# Patient Record
Sex: MALE | HISPANIC OR LATINO | Employment: PART TIME | URBAN - METROPOLITAN AREA
[De-identification: names, ages, dates, MRNs, and addresses within clinical notes are randomized per-mention and may not be internally consistent; named-entity substitution may affect disease eponyms.]

---

## 2022-09-19 ENCOUNTER — LAB (OUTPATIENT)
Dept: LAB | Facility: HOSPITAL | Age: 42
End: 2022-09-19

## 2022-09-19 ENCOUNTER — OFFICE VISIT (OUTPATIENT)
Dept: FAMILY MEDICINE CLINIC | Facility: CLINIC | Age: 42
End: 2022-09-19

## 2022-09-19 VITALS
HEART RATE: 69 BPM | WEIGHT: 139 LBS | OXYGEN SATURATION: 98 % | SYSTOLIC BLOOD PRESSURE: 126 MMHG | HEIGHT: 63 IN | BODY MASS INDEX: 24.63 KG/M2 | DIASTOLIC BLOOD PRESSURE: 74 MMHG

## 2022-09-19 DIAGNOSIS — I25.10 CORONARY ARTERY DISEASE INVOLVING NATIVE HEART WITHOUT ANGINA PECTORIS, UNSPECIFIED VESSEL OR LESION TYPE: ICD-10-CM

## 2022-09-19 DIAGNOSIS — R45.89 DEPRESSED MOOD: ICD-10-CM

## 2022-09-19 DIAGNOSIS — I10 PRIMARY HYPERTENSION: Primary | ICD-10-CM

## 2022-09-19 DIAGNOSIS — I10 PRIMARY HYPERTENSION: ICD-10-CM

## 2022-09-19 DIAGNOSIS — R35.0 URINARY FREQUENCY: ICD-10-CM

## 2022-09-19 DIAGNOSIS — R35.1 NOCTURIA: ICD-10-CM

## 2022-09-19 DIAGNOSIS — Z00.00 ENCOUNTER FOR MEDICAL EXAMINATION TO ESTABLISH CARE: ICD-10-CM

## 2022-09-19 DIAGNOSIS — R42 ORTHOSTATIC DIZZINESS: ICD-10-CM

## 2022-09-19 DIAGNOSIS — G47.9 DIFFICULTY SLEEPING: ICD-10-CM

## 2022-09-19 DIAGNOSIS — I50.32 CHRONIC DIASTOLIC (CONGESTIVE) HEART FAILURE: ICD-10-CM

## 2022-09-19 DIAGNOSIS — Z00.00 ENCOUNTER FOR SCREENING AND PREVENTATIVE CARE: ICD-10-CM

## 2022-09-19 DIAGNOSIS — K21.9 GASTROESOPHAGEAL REFLUX DISEASE, UNSPECIFIED WHETHER ESOPHAGITIS PRESENT: ICD-10-CM

## 2022-09-19 DIAGNOSIS — J43.9 PULMONARY EMPHYSEMA, UNSPECIFIED EMPHYSEMA TYPE: ICD-10-CM

## 2022-09-19 DIAGNOSIS — F41.9 ANXIETY: ICD-10-CM

## 2022-09-19 DIAGNOSIS — Z72.0 TOBACCO USE: ICD-10-CM

## 2022-09-19 LAB
DEPRECATED RDW RBC AUTO: 38.9 FL (ref 37–54)
ERYTHROCYTE [DISTWIDTH] IN BLOOD BY AUTOMATED COUNT: 11.9 % (ref 12.3–15.4)
HBA1C MFR BLD: 5.5 % (ref 4.8–5.6)
HCT VFR BLD AUTO: 50.6 % (ref 37.5–51)
HGB BLD-MCNC: 16.9 G/DL (ref 13–17.7)
MCH RBC QN AUTO: 29.9 PG (ref 26.6–33)
MCHC RBC AUTO-ENTMCNC: 33.4 G/DL (ref 31.5–35.7)
MCV RBC AUTO: 89.6 FL (ref 79–97)
PLATELET # BLD AUTO: 247 10*3/MM3 (ref 140–450)
PMV BLD AUTO: 9.7 FL (ref 6–12)
RBC # BLD AUTO: 5.65 10*6/MM3 (ref 4.14–5.8)
WBC NRBC COR # BLD: 8.72 10*3/MM3 (ref 3.4–10.8)

## 2022-09-19 PROCEDURE — 84153 ASSAY OF PSA TOTAL: CPT

## 2022-09-19 PROCEDURE — 83036 HEMOGLOBIN GLYCOSYLATED A1C: CPT

## 2022-09-19 PROCEDURE — 80053 COMPREHEN METABOLIC PANEL: CPT

## 2022-09-19 PROCEDURE — 85027 COMPLETE CBC AUTOMATED: CPT

## 2022-09-19 PROCEDURE — 80061 LIPID PANEL: CPT

## 2022-09-19 PROCEDURE — 99204 OFFICE O/P NEW MOD 45 MIN: CPT | Performed by: STUDENT IN AN ORGANIZED HEALTH CARE EDUCATION/TRAINING PROGRAM

## 2022-09-19 PROCEDURE — 84443 ASSAY THYROID STIM HORMONE: CPT

## 2022-09-19 RX ORDER — TAMSULOSIN HYDROCHLORIDE 0.4 MG/1
1 CAPSULE ORAL DAILY
COMMUNITY
End: 2022-11-18 | Stop reason: SDUPTHER

## 2022-09-19 RX ORDER — ESCITALOPRAM OXALATE 10 MG/1
10 TABLET ORAL DAILY
Qty: 30 TABLET | Refills: 2 | Status: SHIPPED | OUTPATIENT
Start: 2022-09-19 | End: 2022-10-17 | Stop reason: SDUPTHER

## 2022-09-19 RX ORDER — NITROGLYCERIN 0.3 MG/1
0.3 TABLET SUBLINGUAL
COMMUNITY

## 2022-09-19 RX ORDER — LISINOPRIL AND HYDROCHLOROTHIAZIDE 25; 20 MG/1; MG/1
1 TABLET ORAL DAILY
COMMUNITY
End: 2022-10-17

## 2022-09-19 RX ORDER — IBUPROFEN 800 MG/1
800 TABLET ORAL EVERY 6 HOURS PRN
COMMUNITY
End: 2022-10-17

## 2022-09-19 NOTE — PROGRESS NOTES
"  New Patient Office Visit      Patient Name: Meet Garner  : 1980   MRN: [id number]   Care Team: Patient Care Team:  Catrachita Miller DO as PCP - General (Internal Medicine)    Chief Complaint:    Chief Complaint   Patient presents with   • new patient preventative medicine service     Est care   • Anxiety     Referral to behavorial health    • Dizziness     When standing up to quick   • Leg Pain     Waking up at night, with real bad leg cramps        History of Present Illness: Meet Garner is a 42 y.o. male with HTN, anxiety, GERD, who is here today to establish care. He recently moved from [place] about 1 year ago. He works in machinery. Last PCP was Dr. Darryl Payne in [place], [place]. He is unsure about his current medications and past medical history. He did not bring his medications today. Biggest concerns are postural dizziness and urinary frequency.     Admits to urinary frequency after drinking any liquids. Has been taking tamsulosin. This is been a problem for several years but seems to be worsening. He is unable to empty completely, he has hesitancy and nocturia. He tries to monitor his fluid intake and does not drink prior to bedtime.     Dizziness - reports positional dizziness. Notices this only when move from sitting to standing or lying to standing. Occurs when jumping up really fast and takes about 10-15 minutes before resolving. Sometimes feels he is going to \"black out\". Room starts spinning.      HTN - taking lisinopril-HCTZ since . Blood pressure is well controlled.     He reports history of congestive heart failure. Last cardiologist was in [place], Dr. Bryan?. He has never had a heart cath but reports having mild heart attack several years ago. Reports family history of mother and father with CAD, brother with CAD and CHF in his 30s.    GERD - taking protonix which works well for him    anxiety - constantly worried about all the people in his life. Feels this " interferes with his relationships, productivity at work, and quality of life. He is very interested in establishing with behavioral health for talk therapy but wants to do this in person. Has palpitations with anxiety.    Tobacco use - 1ppd x 30 years. Not interested in quitting entirely today but will discuss in the future. States he was diagnosed with COPD and takes inhalers. Denies SOA, wheezing, dyspnea.     Subjective      Review of Systems:   Review of Systems - See HPI    Past Medical History:   Past Medical History:   Diagnosis Date   • CHF (congestive heart failure) (HCC)    • Hypertension        Past Surgical History: History reviewed. No pertinent surgical history.    Family History:   Family History   Problem Relation Age of Onset   • Stroke Mother    • Heart attack Mother    • Heart failure Mother    • Coronary artery disease Mother 50   • Heart attack Sister    • Coronary artery disease Sister         41   • Heart failure Brother         31   • Heart failure Maternal Grandmother        Social History:   Social History     Socioeconomic History   • Marital status: Single   Tobacco Use   • Smoking status: Current Every Day Smoker     Packs/day: 1.00     Types: Cigarettes   • Smokeless tobacco: Never Used   Vaping Use   • Vaping Use: Never used   Substance and Sexual Activity   • Alcohol use: Not Currently   • Drug use: Never   • Sexual activity: Defer       Tobacco History:   Social History     Tobacco Use   Smoking Status Current Every Day Smoker   • Packs/day: 1.00   • Types: Cigarettes   Smokeless Tobacco Never Used       Medications:     Current Outpatient Medications:   •  ibuprofen (ADVIL,MOTRIN) 800 MG tablet, Take 800 mg by mouth Every 6 (Six) Hours As Needed for Mild Pain., Disp: , Rfl:   •  lisinopril-hydrochlorothiazide (PRINZIDE,ZESTORETIC) 20-25 MG per tablet, Take 1 tablet by mouth Daily., Disp: , Rfl:   •  nitroglycerin (NITROSTAT) 0.3 MG SL tablet, Place 0.3 mg under the tongue., Disp: ,  "Rfl:   •  tamsulosin (FLOMAX) 0.4 MG capsule 24 hr capsule, Take 1 capsule by mouth Daily., Disp: , Rfl:   •  atorvastatin (LIPITOR) 40 MG tablet, Take 40 mg by mouth Daily., Disp: , Rfl:   •  escitalopram (Lexapro) 10 MG tablet, Take 1 tablet by mouth Daily., Disp: 30 tablet, Rfl: 2  •  metoprolol tartrate (LOPRESSOR) 25 MG tablet, Take 25 mg by mouth 2 (Two) Times a Day., Disp: , Rfl:   •  pantoprazole (PROTONIX) 40 MG EC tablet, Take 40 mg by mouth Daily., Disp: , Rfl:     Allergies:   Allergies   Allergen Reactions   • Codeine Swelling   • Penicillins Unknown - High Severity       Objective     Physical Exam:  Vital Signs:   Vitals:    09/19/22 0957 09/19/22 1102 09/19/22 1104 09/19/22 1105   BP: 126/74      Pulse: 69      SpO2: 99% 98% 98% 98%   Weight: 63 kg (139 lb)      Height: 160 cm (63\")        Body mass index is 24.62 kg/m².     Physical Exam  Vitals reviewed.   Constitutional:       Appearance: Normal appearance.   Cardiovascular:      Rate and Rhythm: Normal rate and regular rhythm.      Pulses: Normal pulses.      Heart sounds: Normal heart sounds. No murmur heard.  Pulmonary:      Effort: Pulmonary effort is normal. No respiratory distress.   Skin:     General: Skin is warm and dry.   Neurological:      General: No focal deficit present.      Mental Status: He is alert.      Cranial Nerves: No cranial nerve deficit.   Psychiatric:         Behavior: Behavior normal.         Judgment: Judgment normal.      Comments: anxious         Assessment / Plan      Assessment/Plan:   Problems Addressed This Visit  Diagnoses and all orders for this visit:    1. Primary hypertension (Primary)  -     CBC (No Diff); Future  -     Lipid Panel; Future  -     Comprehensive Metabolic Panel; Future  -     TSH; Future  -     Ambulatory Referral to [facility] [place] - [facility]    Well controlled with current metoprolol and lisinopril-HCTZ. Continue without changes.     2. Encounter for medical examination to " establish care  3. Encounter for screening and preventative care  Discussed importance of preventative care including vaccinations, age appropriate cancer screening, routine lab work, healthy diet, and active lifestyle.  Due for routine labs - ordered today  Will need annual once active problems have stabilized.     4. Pulmonary emphysema, unspecified emphysema type (HCC)  Reports history of COPD but does not use inhalers. would benefit from PFTs and further evaluation,  Will revisit this once other problems have stabilized. He is most concerned with her cardiac, mental and urinary healthy at this time. Encouraged smoking cessation but he is not ready.    5. Urinary frequency  6. Nocturia  -     PSA DIAGNOSTIC ONLY; Future  -     Urinalysis With Microscopic - Urine, Clean Catch; Future  - A1C    Labs and UA today. Continue tamsolosin 0.4mg daily. May be able to increase this to 0.8mg.    7. Orthostatic dizziness  8. Coronary artery disease involving native heart without angina pectoris, unspecified vessel or lesion type  9. Chronic diastolic (congestive) heart failure (HCC)  Family history of premature CAD  -     CBC (No Diff); Future  -     Lipid Panel; Future  -     Hemoglobin A1c; Future  -     Comprehensive Metabolic Panel; Future  -     TSH; Future    He reports history of CAD and CHF although he is a poor historian, unsure if he has had a heart cath, echo, or most recent stress. Prior cardiologist was in [place] several years ago, records requested.    I feels his dizziness sounds orthostatic in nature but it is abnormal that symptoms last for up to 15 minutes and with his history he would benefit from cardiac evaluation. Referred to heart and valve clinic.     10. Anxiety  11. Depressed mood  -     escitalopram (Lexapro) 10 MG tablet; Take 1 tablet by mouth Daily.  Dispense: 30 tablet; Refill: 2  Uncontrolled, start trial of SSRI and reassess in 4 weeks   Provided with information for patient to reach out  to local mental health providers for talk therapy in person.    12. Difficulty sleeping  -     escitalopram (Lexapro) 10 MG tablet; Take 1 tablet by mouth Daily.  Dispense: 30 tablet; Refill: 2  Difficulty sleeping is in response to uncontrolled anxiety, will work on management of anxiety as detailed above    13. Tobacco use  Encouraged smoking cessation and counseled on adverse health risks associated with continued smoking. Discussed options for assistance including NRT, medications, and cessation therapy. Patient is not agreeable to quitting smoking at this time    14. Gastroesophageal reflux disease, unspecified whether esophagitis present  Continue PPI        Plan of care reviewed with patient at the conclusion of today's visit. Education was provided regarding diagnosis and management.  Patient verbalizes understanding of and agreement with management plan.      Follow Up:   Return in about 4 weeks (around 10/17/2022) for Recheck mood, HTN, urinary symptoms. Needs 30 minute appointment.          DO DYLAN Willis RD  [facility]  [address]  [facility] [phone]  Fax [phone]  Phone [phone]

## 2022-09-20 LAB
ALBUMIN SERPL-MCNC: 4.3 G/DL (ref 3.5–5.2)
ALBUMIN/GLOB SERPL: 2.4 G/DL
ALP SERPL-CCNC: 83 U/L (ref 39–117)
ALT SERPL W P-5'-P-CCNC: 51 U/L (ref 1–41)
ANION GAP SERPL CALCULATED.3IONS-SCNC: 12.7 MMOL/L (ref 5–15)
AST SERPL-CCNC: 44 U/L (ref 1–40)
BILIRUB SERPL-MCNC: 0.3 MG/DL (ref 0–1.2)
BUN SERPL-MCNC: 19 MG/DL (ref 6–20)
BUN/CREAT SERPL: 11.5 (ref 7–25)
CALCIUM SPEC-SCNC: 9.5 MG/DL (ref 8.6–10.5)
CHLORIDE SERPL-SCNC: 97 MMOL/L (ref 98–107)
CHOLEST SERPL-MCNC: 124 MG/DL (ref 0–200)
CO2 SERPL-SCNC: 29.3 MMOL/L (ref 22–29)
CREAT SERPL-MCNC: 1.65 MG/DL (ref 0.76–1.27)
EGFRCR SERPLBLD CKD-EPI 2021: 52.8 ML/MIN/1.73
GLOBULIN UR ELPH-MCNC: 1.8 GM/DL
GLUCOSE SERPL-MCNC: 96 MG/DL (ref 65–99)
HDLC SERPL-MCNC: 36 MG/DL (ref 40–60)
LDLC SERPL CALC-MCNC: 55 MG/DL (ref 0–100)
LDLC/HDLC SERPL: 1.32 {RATIO}
POTASSIUM SERPL-SCNC: 3.7 MMOL/L (ref 3.5–5.2)
PROT SERPL-MCNC: 6.1 G/DL (ref 6–8.5)
PSA SERPL-MCNC: 0.98 NG/ML (ref 0–4)
SODIUM SERPL-SCNC: 139 MMOL/L (ref 136–145)
TRIGL SERPL-MCNC: 202 MG/DL (ref 0–150)
TSH SERPL DL<=0.05 MIU/L-ACNC: 1.63 UIU/ML (ref 0.27–4.2)
VLDLC SERPL-MCNC: 33 MG/DL (ref 5–40)

## 2022-09-21 ENCOUNTER — TELEPHONE (OUTPATIENT)
Dept: FAMILY MEDICINE CLINIC | Facility: CLINIC | Age: 42
End: 2022-09-21

## 2022-09-21 DIAGNOSIS — N17.9 AKI (ACUTE KIDNEY INJURY): Primary | ICD-10-CM

## 2022-09-21 NOTE — TELEPHONE ENCOUNTER
Please call patient to discuss results:  His kidney function is slightly lower than normal. I have not received his records yet and I do not have labs to compare to over the past 4 years so I am not sure if this is new or if he has history of chronic kidney disease. Is he aware of any kidney problems he may have? It is important that he remain hydrated and avoid medications such as advil, aleve, ibuprofen, other NSAIDs to avoid kidney damage. If this is a new problem, we will need to repeat his labs in 1 week to monitor.     His prostate marker is normal. Thyroid function is normal. Cholesterol looks good. Diabetes screening is negative. It looks like his urine sample was never collected?

## 2022-09-21 NOTE — TELEPHONE ENCOUNTER
Spoke with patient regarding lab results. Asked him if he has ever been told that he had kidney function decrease in the past. He did not recall.   Advised patient to avoid nsaids, and stay hydrated.   Explained to patient to come back in 1 week for repeat labs. The patient verbalized understanding and will repeat labs in 1 week.     Please place lab orders.

## 2022-09-23 ENCOUNTER — LAB (OUTPATIENT)
Dept: LAB | Facility: HOSPITAL | Age: 42
End: 2022-09-23

## 2022-09-23 DIAGNOSIS — N17.9 AKI (ACUTE KIDNEY INJURY): ICD-10-CM

## 2022-09-23 LAB
ANION GAP SERPL CALCULATED.3IONS-SCNC: 13.6 MMOL/L (ref 5–15)
BACTERIA UR QL AUTO: NORMAL /HPF
BILIRUB UR QL STRIP: NEGATIVE
BUN SERPL-MCNC: 20 MG/DL (ref 6–20)
BUN/CREAT SERPL: 16.3 (ref 7–25)
CALCIUM SPEC-SCNC: 9.6 MG/DL (ref 8.6–10.5)
CHLORIDE SERPL-SCNC: 102 MMOL/L (ref 98–107)
CLARITY UR: CLEAR
CO2 SERPL-SCNC: 25.4 MMOL/L (ref 22–29)
COLOR UR: YELLOW
CREAT SERPL-MCNC: 1.23 MG/DL (ref 0.76–1.27)
EGFRCR SERPLBLD CKD-EPI 2021: 75.2 ML/MIN/1.73
GLUCOSE SERPL-MCNC: 97 MG/DL (ref 65–99)
GLUCOSE UR STRIP-MCNC: NEGATIVE MG/DL
HGB UR QL STRIP.AUTO: NEGATIVE
HYALINE CASTS UR QL AUTO: NORMAL /LPF
KETONES UR QL STRIP: NEGATIVE
LEUKOCYTE ESTERASE UR QL STRIP.AUTO: NEGATIVE
NITRITE UR QL STRIP: NEGATIVE
PH UR STRIP.AUTO: 6 [PH] (ref 5–8)
POTASSIUM SERPL-SCNC: 3.9 MMOL/L (ref 3.5–5.2)
PROT UR QL STRIP: NEGATIVE
RBC # UR STRIP: NORMAL /HPF
REF LAB TEST METHOD: NORMAL
SODIUM SERPL-SCNC: 141 MMOL/L (ref 136–145)
SP GR UR STRIP: 1.02 (ref 1–1.03)
SQUAMOUS #/AREA URNS HPF: NORMAL /HPF
UROBILINOGEN UR QL STRIP: NORMAL
WBC # UR STRIP: NORMAL /HPF

## 2022-09-23 PROCEDURE — 81001 URINALYSIS AUTO W/SCOPE: CPT

## 2022-09-23 PROCEDURE — 80048 BASIC METABOLIC PNL TOTAL CA: CPT

## 2022-09-27 NOTE — PROGRESS NOTES
"[facility]  Heart [facility] Val[facility]    Chief Complaint  Establish Care, Hypertension, and Dizziness    Subjective    History of Present Illness {CC  Problem List  Visit  Diagnosis   Encounters  Notes  Medications  Labs  Result Review Imaging  Media :23}     Meet Garner is a 42 y.o. male with hypertension, hyperlipidemia,anxiety, GERD who presents today for evaluation of heart failure at the request of Dr. Miller    He reports around 2008 he started having episodes of near syncope/syncope. He says he would just pass out in class or just fall out of his bed. Symptoms also would happen after showers. He was told he had a heart attack. He was followed by Dr. Steinberg in [place]. He has never had a LHC. He was told that when he was in his 50s that he would need a stent or a pacemaker. He was placed on metoprolol. Over the past 2 weeks he has been having increasing episodes of near syncope. Works as a  and is exposed to a lot of chemicals. He notes worsening shortness of breath. Dizziness happens when he goes from sitting to standing, bending over or climbing stairs. He reports that the room spins but also \"blacks out\". Reports he \"blacked out\" at work recently and his boss had to catch him. He does report heart racing. Occasional chest discomfort, sometimes at rest and with stress. Sometimes with activity. He believes he was told he had congestive heart failure. No hx of ICD. No orthopnea/PND or edema.  He says he might have a hole in his heart    His mother reports a strong family hx of HF.She says she also has a hole in the back of her heart but denies weak heart muscle or ICD. She does have one stent placed in the late 40s or early 50s. His mother reports his sister is 41 and has had \"multiple heart surgeries\", she says she has stents and has been \"brought back to life\" multiple times but does not have an ICD. Brother also has CHF but no ICD    No ETOH. 1 cup coffee     " "Cardiac risks: HTN, tobacco abuse, gender, family hx    Objective     Vital Signs:   Vitals:    09/28/22 1009 09/28/22 1011 09/28/22 1012   BP: 106/62 122/64 122/76   BP Location: Right arm Left arm Left arm   Patient Position: Sitting Sitting Standing   Cuff Size: Adult Adult Adult   Pulse: 56 58 56   Resp: 18     Temp: 97 °F (36.1 °C)     TempSrc: Temporal     SpO2: 100%     Weight: 63 kg (139 lb)     Height: 160 cm (63\")       Body mass index is 24.62 kg/m².  Physical Exam  Vitals reviewed.   Constitutional:       Appearance: Normal appearance.   HENT:      Head: Normocephalic.   Neck:      Vascular: No carotid bruit.   Cardiovascular:      Rate and Rhythm: Regular rhythm. Bradycardia present.      Pulses: Normal pulses.      Heart sounds: Normal heart sounds, S1 normal and S2 normal. No murmur heard.  Pulmonary:      Effort: Pulmonary effort is normal. No respiratory distress.      Breath sounds: Normal breath sounds.   Chest:      Chest wall: No tenderness.   Abdominal:      General: Abdomen is flat.      Palpations: Abdomen is soft.   Musculoskeletal:      Cervical back: Neck supple.      Right lower leg: No edema.      Left lower leg: No edema.   Skin:     General: Skin is warm and dry.   Neurological:      General: No focal deficit present.      Mental Status: He is alert and oriented to person, place, and time. Mental status is at baseline.   Psychiatric:         Mood and Affect: Mood normal.         Behavior: Behavior normal.         Thought Content: Thought content normal.              Result Review  Data Reviewed:{ Labs  Result Review  Imaging  Med Tab  Media :23}   Urinalysis With Microscopic - Urine, Clean Catch (09/23/2022 08:10)  Basic metabolic panel (09/23/2022 08:10)  Urinalysis With Microscopic - Urine, Clean Catch (09/19/2022 11:20)  PSA DIAGNOSTIC ONLY (09/19/2022 11:19)  TSH (09/19/2022 11:19)  Comprehensive Metabolic Panel (09/19/2022 11:19)  Hemoglobin A1c (09/19/2022 11:19)  Lipid Panel " (09/19/2022 11:19)  CBC (No Diff) (09/19/2022 11:19)   EKG today shows sinus bradycardia, RSR or QR pattern in V1 suggestive of right ventricular conduction delay  Consultant notes Dr. Miller            Assessment and Plan {CC Problem List  Visit Diagnosis  ROS  Review (Popup)  Health Maintenance  Quality  BestPractice  Medications  SmartSets  SnapShot Encounters  Media :23}   1. SHELBY (dyspnea on exertion)  Reports history of heart failure, data deficit we will obtain cardiology records  - ECG 12 Lead; Future  - Adult Stress Echo W/ Cont or Stress Agent if Necessary Per Protocol; Future    2. Near syncope    - Adult Stress Echo W/ Cont or Stress Agent if Necessary Per Protocol; Future  - Mobile Cardiac Outpatient Telemetry; Future    3. Chest pain, unspecified type  Reports hx of MI in the past but has never had heart cath. Will try and get records  - Adult Stress Echo W/ Cont or Stress Agent if Necessary Per Protocol; Future    4. Syncope and collapse    - Adult Stress Echo W/ Cont or Stress Agent if Necessary Per Protocol; Future  - Mobile Cardiac Outpatient Telemetry; Future    5. Family history of premature CAD    - Adult Stress Echo W/ Cont or Stress Agent if Necessary Per Protocol; Future    6. Palpitations    - Adult Stress Echo W/ Cont or Stress Agent if Necessary Per Protocol; Future  - Mobile Cardiac Outpatient Telemetry; Future          Follow Up {Instructions Charge Capture  Follow-up Communications :23}   Return in about 6 weeks (around 11/9/2022) for Office follow up, Monitor results.    Patient was given instructions and counseling regarding his condition or for health maintenance advice. Please see specific information pulled into the AVS if appropriate.  Advised to call the Heart and Valve Center with any questions, concerns, or worsening symptoms.

## 2022-09-28 ENCOUNTER — HOSPITAL ENCOUNTER (OUTPATIENT)
Dept: CARDIOLOGY | Facility: HOSPITAL | Age: 42
Discharge: HOME | End: 2022-09-28

## 2022-09-28 ENCOUNTER — OFFICE VISIT (OUTPATIENT)
Dept: CARDIOLOGY | Facility: HOSPITAL | Age: 42
End: 2022-09-28

## 2022-09-28 VITALS
TEMPERATURE: 97 F | SYSTOLIC BLOOD PRESSURE: 122 MMHG | RESPIRATION RATE: 18 BRPM | WEIGHT: 139 LBS | OXYGEN SATURATION: 100 % | HEIGHT: 63 IN | HEART RATE: 56 BPM | DIASTOLIC BLOOD PRESSURE: 76 MMHG | BODY MASS INDEX: 24.63 KG/M2

## 2022-09-28 DIAGNOSIS — R55 SYNCOPE AND COLLAPSE: ICD-10-CM

## 2022-09-28 DIAGNOSIS — Z82.49 FAMILY HISTORY OF PREMATURE CAD: ICD-10-CM

## 2022-09-28 DIAGNOSIS — R55 NEAR SYNCOPE: ICD-10-CM

## 2022-09-28 DIAGNOSIS — R00.2 PALPITATIONS: ICD-10-CM

## 2022-09-28 DIAGNOSIS — R06.09 DOE (DYSPNEA ON EXERTION): ICD-10-CM

## 2022-09-28 DIAGNOSIS — R06.09 DOE (DYSPNEA ON EXERTION): Primary | ICD-10-CM

## 2022-09-28 DIAGNOSIS — R07.9 CHEST PAIN, UNSPECIFIED TYPE: ICD-10-CM

## 2022-09-28 PROBLEM — K21.9 GASTROESOPHAGEAL REFLUX DISEASE: Status: ACTIVE | Noted: 2022-09-28

## 2022-09-28 PROBLEM — G47.9 DIFFICULTY SLEEPING: Status: ACTIVE | Noted: 2022-09-28

## 2022-09-28 PROBLEM — F41.9 ANXIETY: Status: ACTIVE | Noted: 2022-09-28

## 2022-09-28 PROBLEM — I10 PRIMARY HYPERTENSION: Status: ACTIVE | Noted: 2022-09-28

## 2022-09-28 PROBLEM — R42 ORTHOSTATIC DIZZINESS: Status: ACTIVE | Noted: 2022-09-28

## 2022-09-28 PROBLEM — I25.10 CORONARY ARTERY DISEASE INVOLVING NATIVE HEART WITHOUT ANGINA PECTORIS: Status: ACTIVE | Noted: 2022-09-28

## 2022-09-28 PROBLEM — R35.1 NOCTURIA: Status: ACTIVE | Noted: 2022-09-28

## 2022-09-28 PROBLEM — Z72.0 TOBACCO USE: Status: ACTIVE | Noted: 2022-09-28

## 2022-09-28 PROBLEM — R45.89 DEPRESSED MOOD: Status: ACTIVE | Noted: 2022-09-28

## 2022-09-28 LAB
QT INTERVAL: 414 MS
QTC INTERVAL: 385 MS

## 2022-09-28 PROCEDURE — 93005 ELECTROCARDIOGRAM TRACING: CPT | Performed by: NURSE PRACTITIONER

## 2022-09-28 PROCEDURE — 93010 ELECTROCARDIOGRAM REPORT: CPT | Performed by: INTERNAL MEDICINE

## 2022-09-28 PROCEDURE — 99204 OFFICE O/P NEW MOD 45 MIN: CPT | Performed by: NURSE PRACTITIONER

## 2022-09-28 RX ORDER — ATORVASTATIN CALCIUM 40 MG/1
40 TABLET, FILM COATED ORAL DAILY
COMMUNITY
End: 2022-11-18 | Stop reason: SDUPTHER

## 2022-09-28 RX ORDER — PANTOPRAZOLE SODIUM 40 MG/1
40 TABLET, DELAYED RELEASE ORAL DAILY
COMMUNITY
End: 2022-11-18 | Stop reason: SDUPTHER

## 2022-09-28 NOTE — PROGRESS NOTES
[facility] Heart Monitor Documentation    Meet Garner  1980  [id number]  09/28/22      [] ZIO XT Patch  Model [id number] Prescribed for N/A Days    · Serial Number: (N + 9 Digits) N   · Apply-By Date on Box:   · USPS Tracking Number:   · USPS Tracking        [] [employer] Body[employer]ian MINI PLUS Mobile Cardiac Telemetry  Model BGMINIPLUS Prescribed for 30 Days    · Serial Number: (BGM + 7 Digits) [id number]  · Shipped-By Date on Box: 9/16/22  · UPS Tracking Number: [id number]  · UPS Tracking      [] [employer] MINI Holter Monitor  Model BGMINIEL Prescribed for N/A Days    · Serial Number: (7 Digits)   · Shipped-By Date on Box:   · UPS Tracking Number: [id number]  · UPS Tracking        This monitor was applied to the patient's chest and checked for proper functioning.  Mr. Meet Ganrer was instructed in the proper use of this monitor.  He was given the opportunity to ask questions and left the office with the device 's instruction manual.    Maximo Andujar MA, 11:03 EDT, 09/28/22                  [facility]

## 2022-09-29 ENCOUNTER — TELEPHONE (OUTPATIENT)
Dept: CARDIOLOGY | Facility: HOSPITAL | Age: 42
End: 2022-09-29

## 2022-09-29 NOTE — TELEPHONE ENCOUNTER
----- Message from Maximo Andujar MA sent at 9/29/2022  9:45 AM EDT -----  So I have spoke to Everyone I could find in [place] with a MD brown or even [facility], and they don't have the patient listed. Ill call the patient.  ----- Message -----  From: Socorro Medeiros APRN  Sent: 9/28/2022  10:39 AM EDT  To: Maximo Andujar MA    Can you please get records from Dr. Brown, cardiologist in [employer]?

## 2022-10-10 ENCOUNTER — TELEPHONE (OUTPATIENT)
Dept: FAMILY MEDICINE CLINIC | Facility: CLINIC | Age: 42
End: 2022-10-10

## 2022-10-10 NOTE — TELEPHONE ENCOUNTER
Caller: BONILLA DUARTE    Relationship: Emergency Contact    Best call back number:  [phone]    What form or medical record are you requesting:     NOTE STATING PATIENT CAN BE AT WORK IN HIS CONDITION       How would you like to receive the form or medical records (pick-up, mail, fax):      PICKUP     Timeframe paperwork needed: AS SOON AS POSSIBLE    Additional notes:     PLEASE CALL WHEN PAPERWORK IS READY

## 2022-10-10 NOTE — TELEPHONE ENCOUNTER
Caller: BONILLA DUARTE    Relationship: Emergency Contact    Best call back number: [phone]    What form or medical record are you requesting: WORK RELEASE    Who is requesting this form or medical record from you: WORK    How would you like to receive the form or medical records (pick-up, mail, fax):     Timeframe paperwork needed: ASAP      NEED A PAPER STATING HE CAN RETURN TO WORK.

## 2022-10-11 ENCOUNTER — TELEPHONE (OUTPATIENT)
Dept: CARDIOLOGY | Facility: HOSPITAL | Age: 42
End: 2022-10-11

## 2022-10-11 DIAGNOSIS — R07.9 CHEST PAIN, UNSPECIFIED TYPE: ICD-10-CM

## 2022-10-11 DIAGNOSIS — R00.2 PALPITATIONS: ICD-10-CM

## 2022-10-11 DIAGNOSIS — R55 NEAR SYNCOPE: Primary | ICD-10-CM

## 2022-10-11 DIAGNOSIS — R06.09 DOE (DYSPNEA ON EXERTION): ICD-10-CM

## 2022-10-11 DIAGNOSIS — R55 SYNCOPE AND COLLAPSE: ICD-10-CM

## 2022-10-11 NOTE — TELEPHONE ENCOUNTER
----- Message from Maximo Andujar MA sent at 10/11/2022  4:11 PM EDT -----  Just got word That Dr. Steinberg did occassionally see prisoners, patient was apparently incarcerated. But they do not however have the ability to share the records. She is going to talk to her Supervisor to see if she can but she doesn't think so, ill let ya know.  ----- Message -----  From: Socorro Medeiros APRN  Sent: 10/11/2022   3:50 PM EDT  To: VENANCIO Whitaker,    Were you ever able to find records on this patient?    Thanks!

## 2022-10-12 ENCOUNTER — HOSPITAL ENCOUNTER (OUTPATIENT)
Dept: CARDIOLOGY | Facility: HOSPITAL | Age: 42
End: 2022-10-12

## 2022-10-14 ENCOUNTER — APPOINTMENT (OUTPATIENT)
Dept: CARDIOLOGY | Facility: HOSPITAL | Age: 42
End: 2022-10-14

## 2022-10-17 ENCOUNTER — OFFICE VISIT (OUTPATIENT)
Dept: FAMILY MEDICINE CLINIC | Facility: CLINIC | Age: 42
End: 2022-10-17

## 2022-10-17 VITALS
BODY MASS INDEX: 25.16 KG/M2 | DIASTOLIC BLOOD PRESSURE: 78 MMHG | HEART RATE: 76 BPM | TEMPERATURE: 97.3 F | WEIGHT: 142 LBS | SYSTOLIC BLOOD PRESSURE: 120 MMHG | HEIGHT: 63 IN | OXYGEN SATURATION: 97 %

## 2022-10-17 DIAGNOSIS — G47.9 DIFFICULTY SLEEPING: ICD-10-CM

## 2022-10-17 DIAGNOSIS — F41.9 ANXIETY: Primary | ICD-10-CM

## 2022-10-17 DIAGNOSIS — K62.5 BRBPR (BRIGHT RED BLOOD PER RECTUM): ICD-10-CM

## 2022-10-17 DIAGNOSIS — I10 PRIMARY HYPERTENSION: ICD-10-CM

## 2022-10-17 DIAGNOSIS — Z23 NEED FOR INFLUENZA VACCINATION: ICD-10-CM

## 2022-10-17 DIAGNOSIS — R00.2 PALPITATIONS: ICD-10-CM

## 2022-10-17 DIAGNOSIS — K59.00 CONSTIPATION, UNSPECIFIED CONSTIPATION TYPE: ICD-10-CM

## 2022-10-17 DIAGNOSIS — R45.89 DEPRESSED MOOD: ICD-10-CM

## 2022-10-17 DIAGNOSIS — Z82.49 FAMILY HISTORY OF PREMATURE CAD: ICD-10-CM

## 2022-10-17 PROCEDURE — 99214 OFFICE O/P EST MOD 30 MIN: CPT | Performed by: STUDENT IN AN ORGANIZED HEALTH CARE EDUCATION/TRAINING PROGRAM

## 2022-10-17 PROCEDURE — 90471 IMMUNIZATION ADMIN: CPT | Performed by: STUDENT IN AN ORGANIZED HEALTH CARE EDUCATION/TRAINING PROGRAM

## 2022-10-17 PROCEDURE — 90686 IIV4 VACC NO PRSV 0.5 ML IM: CPT | Performed by: STUDENT IN AN ORGANIZED HEALTH CARE EDUCATION/TRAINING PROGRAM

## 2022-10-17 RX ORDER — AMLODIPINE BESYLATE 5 MG/1
5 TABLET ORAL DAILY
Qty: 30 TABLET | Refills: 2 | Status: SHIPPED | OUTPATIENT
Start: 2022-10-17 | End: 2022-11-18 | Stop reason: SDUPTHER

## 2022-10-17 RX ORDER — ESCITALOPRAM OXALATE 20 MG/1
20 TABLET ORAL DAILY
Qty: 30 TABLET | Refills: 5 | Status: SHIPPED | OUTPATIENT
Start: 2022-10-17

## 2022-10-17 RX ORDER — POLYETHYLENE GLYCOL 3350 17 G/17G
17 POWDER, FOR SOLUTION ORAL DAILY
Qty: 30 EACH | Refills: 5 | Status: SHIPPED | OUTPATIENT
Start: 2022-10-17 | End: 2022-11-18

## 2022-10-17 RX ORDER — LISINOPRIL 20 MG/1
20 TABLET ORAL DAILY
Qty: 30 TABLET | Refills: 2 | Status: SHIPPED | OUTPATIENT
Start: 2022-10-17 | End: 2022-11-18 | Stop reason: SDUPTHER

## 2022-10-17 NOTE — PROGRESS NOTES
"  Established Office Visit      Patient Name: Meet Garner  : 1980   MRN: [id number]   Care Team: Patient Care Team:  Catrachita Miller DO as PCP - General (Internal Medicine)    Chief Complaint:    Chief Complaint   Patient presents with   • depressed mood     4 week f/u    • Hypertension   • Difficulty Urinating       History of Present Illness: Meet Garner is a 42 y.o. male with HTN, anxiety, depression, GERD, tobacco use who is here today to follow up with LUTS, HTN, anxiety.    HTN - Well controlled with current lisinopril-HCTZ     Anxiety/depression - started lexapro 10mg daily about 4 weeks ago. He has noticed improvement in irritability, racing thoughts, aggravation. He requests increasing this dose.     He reports feeling palpitations, light headed, dizzy on 10/14. He was taken to the nearest hospital, [facility]. He was told in the ER that he \"had a mild heart attack\" but was not admitted. He denies any medication changes. He is wearing an event monitor and following with heart and valve clinic, has follow up in 11 days.     LUTS - symptoms unchanged since last visit. Having to urinate multiple times per hour. He is taking HCTZ for several years and has had urinary frequency for several years. He would like to change BP medications.     He reports intermittent BRBPR over the past 2-3 weeks. Describes as a toilet full of blood. Reports occasional pain with defecation. He has occasional constipation.     Subjective      Review of Systems:   Review of Systems - See HPI    I have reviewed and the following portions of the patient's history were updated as appropriate: past family history, past medical history, past social history, past surgical history and problem list.    Medications:     Current Outpatient Medications:   •  atorvastatin (LIPITOR) 40 MG tablet, Take 40 mg by mouth Daily., Disp: , Rfl:   •  escitalopram (Lexapro) 20 MG tablet, Take 1 tablet by mouth Daily., Disp: 30 tablet, Rfl: " "5  •  nitroglycerin (NITROSTAT) 0.3 MG SL tablet, Place 0.3 mg under the tongue., Disp: , Rfl:   •  pantoprazole (PROTONIX) 40 MG EC tablet, Take 40 mg by mouth Daily., Disp: , Rfl:   •  tamsulosin (FLOMAX) 0.4 MG capsule 24 hr capsule, Take 1 capsule by mouth Daily., Disp: , Rfl:   •  amLODIPine (NORVASC) 5 MG tablet, Take 1 tablet by mouth Daily., Disp: 30 tablet, Rfl: 2  •  lisinopril (PRINIVIL,ZESTRIL) 20 MG tablet, Take 1 tablet by mouth Daily., Disp: 30 tablet, Rfl: 2  •  metoprolol tartrate (LOPRESSOR) 25 MG tablet, Take 25 mg by mouth 2 (Two) Times a Day., Disp: , Rfl:   •  polyethylene glycol (MIRALAX) 17 g packet, Take 17 g by mouth Daily., Disp: 30 each, Rfl: 5    Allergies:   Allergies   Allergen Reactions   • Codeine Swelling   • Penicillins Unknown - High Severity       Objective     Physical Exam:  Vital Signs:   Vitals:    10/17/22 0920   BP: 120/78   Pulse: 76   Temp: 97.3 °F (36.3 °C)   SpO2: 97%   Weight: 64.4 kg (142 lb)   Height: 160 cm (63\")     Body mass index is 25.15 kg/m².     Physical Exam  Vitals reviewed.   Constitutional:       Appearance: Normal appearance.   Cardiovascular:      Rate and Rhythm: Normal rate and regular rhythm.      Heart sounds: Normal heart sounds. No murmur heard.     Comments: No LE edema   Pulmonary:      Effort: Pulmonary effort is normal. No respiratory distress.      Breath sounds: Normal breath sounds. No wheezing.   Skin:     General: Skin is warm and dry.   Neurological:      Mental Status: He is alert.   Psychiatric:         Mood and Affect: Mood normal.         Behavior: Behavior normal.         Judgment: Judgment normal.         Assessment / Plan      Assessment/Plan:   Problems Addressed This Visit  Diagnoses and all orders for this visit:    1. Anxiety (Primary)  2. Depressed mood  -     escitalopram (Lexapro) 20 MG tablet; Take 1 tablet by mouth Daily.  Dispense: 30 tablet; Refill: 5  Improved, will increase lexapro to 20mg daily. Reassess in 4-6 weeks " "    3. Difficulty sleeping  -     escitalopram (Lexapro) 20 MG tablet; Take 1 tablet by mouth Daily.  Dispense: 30 tablet; Refill: 5    4. Constipation, unspecified constipation type  -     Ambulatory Referral to Gastroenterology  -     Ambulatory Referral For Screening Colonoscopy  -     polyethylene glycol (MIRALAX) 17 g packet; Take 17 g by mouth Daily.  Dispense: 30 each; Refill: 5    5. BRBPR (bright red blood per rectum)  -     Ambulatory Referral to Gastroenterology  -     Ambulatory Referral For Screening Colonoscopy  New and requires further workup. We discussed multiple causes for painful intermittent BRPBR with most likely being anal fissure or hemorrhoids. He reports large amount of blood in toilet bowel and wants to have colonoscopy for further evaluation to rule out malignancy. I have referred to GI and for colonoscopy. Discussed the importance of treating constipation and avoiding straining. Recommended increased water intake and miralax daily.     6. Primary hypertension  Will switch from HCTZ to amlodipine 5mg, he feels HCTZ may be causing his frequent urination. Continue lisinopril 20mg daily. Return in 4-6 weeks for HTN and see if LUTS have improved    7. Family history of premature CAD  8. Palpitations  -     lisinopril (PRINIVIL,ZESTRIL) 20 MG tablet; Take 1 tablet by mouth Daily.  Dispense: 30 tablet; Refill: 2  -     amLODIPine (NORVASC) 5 MG tablet; Take 1 tablet by mouth Daily.  Dispense: 30 tablet; Refill: 2  He is following with heart and valve clinic, wearing event monitor now. He denies current CP and palpitations. I have requested records from [facility] for independent review. Patient reports \"mild heart attack\" last week but this diagnosis is uncertain as he was not admitted and did not undergo any medication changes.     9. Need for influenza vaccination  -     FluLaval/Fluzone >6 mos ([id number])          Plan of care reviewed with patient at the conclusion of today's visit. " Education was provided regarding diagnosis and management.  Patient verbalizes understanding of and agreement with management plan.    Follow Up:   Return in about 4 weeks (around 11/14/2022) for 4-6 weeks to Recheck HTN, mood .        DO DYLAN Willis RD  [facility]  [address]  [facility] [address]  Fax [phone]  Phone [phone]

## 2022-10-20 ENCOUNTER — DOCUMENTATION (OUTPATIENT)
Dept: FAMILY MEDICINE CLINIC | Facility: CLINIC | Age: 42
End: 2022-10-20

## 2022-10-20 ENCOUNTER — TELEPHONE (OUTPATIENT)
Dept: FAMILY MEDICINE CLINIC | Facility: CLINIC | Age: 42
End: 2022-10-20

## 2022-10-20 NOTE — TELEPHONE ENCOUNTER
Caller: Meet Garner    Relationship: Self    Best call back number: [phone]    What form or medical record are you requesting:     WORK/CLASS EXCUSE    Who is requesting this form or medical record from you: EMPLOYER    How would you like to receive the form or medical records (pick-up, mail, fax):   PICKUP       Timeframe paperwork needed: TODAY 10/20    Additional notes:     PATIENT WAS IN THE OFFICE ON OCT 17TH.  NEEDS A WORK/CLASS EXCUSE FOR 10/17-10/20.    PLEASE CALL PATIENT WHEN EXCUSE IS READY

## 2022-10-20 NOTE — TELEPHONE ENCOUNTER
Contacted patient to inquire further, he reports that his school advised him to take some time off but requested that he provide documentation that he was at our office 10/17 excusing him until today    Letter has been written and placed at patient pickup for retrieval, as requested

## 2022-10-20 NOTE — PROGRESS NOTES
Reviewed ER records from [facility] [place], [place] 9/30/22. Patient presented to ER with nonspecific chest pain. Records report stable EKG and nonactionable labs. He was discharged home. No record of NSTEMI/STEMI.

## 2022-10-25 ENCOUNTER — HOSPITAL ENCOUNTER (OUTPATIENT)
Dept: CARDIOLOGY | Facility: HOSPITAL | Age: 42
Discharge: HOME | End: 2022-10-25

## 2022-10-25 ENCOUNTER — TELEPHONE (OUTPATIENT)
Dept: CARDIOLOGY | Facility: HOSPITAL | Age: 42
End: 2022-10-25

## 2022-10-25 VITALS
SYSTOLIC BLOOD PRESSURE: 110 MMHG | DIASTOLIC BLOOD PRESSURE: 82 MMHG | WEIGHT: 142 LBS | HEIGHT: 63 IN | HEART RATE: 64 BPM | BODY MASS INDEX: 25.16 KG/M2

## 2022-10-25 VITALS — BODY MASS INDEX: 25.16 KG/M2 | HEIGHT: 63 IN | WEIGHT: 141.98 LBS

## 2022-10-25 DIAGNOSIS — R00.2 PALPITATIONS: ICD-10-CM

## 2022-10-25 DIAGNOSIS — R06.09 DOE (DYSPNEA ON EXERTION): ICD-10-CM

## 2022-10-25 DIAGNOSIS — R55 SYNCOPE AND COLLAPSE: ICD-10-CM

## 2022-10-25 DIAGNOSIS — R07.9 CHEST PAIN, UNSPECIFIED TYPE: ICD-10-CM

## 2022-10-25 DIAGNOSIS — R55 NEAR SYNCOPE: ICD-10-CM

## 2022-10-25 DIAGNOSIS — R07.9 CHEST PAIN, UNSPECIFIED TYPE: Primary | ICD-10-CM

## 2022-10-25 DIAGNOSIS — Z82.49 FAMILY HISTORY OF PREMATURE CAD: ICD-10-CM

## 2022-10-25 LAB
BH CV ECHO MEAS - AO MAX PG: 7.4 MMHG
BH CV ECHO MEAS - AO MEAN PG: 4.4 MMHG
BH CV ECHO MEAS - AO ROOT DIAM: 2.9 CM
BH CV ECHO MEAS - AO V2 MAX: 135.7 CM/SEC
BH CV ECHO MEAS - AO V2 VTI: 32.7 CM
BH CV ECHO MEAS - AVA(I,D): 1.37 CM2
BH CV ECHO MEAS - EDV(CUBED): 107 ML
BH CV ECHO MEAS - EDV(MOD-SP2): 89.3 ML
BH CV ECHO MEAS - EDV(MOD-SP4): 97 ML
BH CV ECHO MEAS - EF(MOD-BP): 63.1 %
BH CV ECHO MEAS - EF(MOD-SP2): 64.6 %
BH CV ECHO MEAS - EF(MOD-SP4): 61.6 %
BH CV ECHO MEAS - ESV(CUBED): 20.8 ML
BH CV ECHO MEAS - ESV(MOD-SP2): 31.6 ML
BH CV ECHO MEAS - ESV(MOD-SP4): 37.2 ML
BH CV ECHO MEAS - FS: 42.1 %
BH CV ECHO MEAS - IVS/LVPW: 1.2 CM
BH CV ECHO MEAS - IVSD: 0.72 CM
BH CV ECHO MEAS - LA DIMENSION: 3.3 CM
BH CV ECHO MEAS - LAT PEAK E' VEL: 12.7 CM/SEC
BH CV ECHO MEAS - LV DIASTOLIC VOL/BSA (35-75): 58 CM2
BH CV ECHO MEAS - LV MASS(C)D: 97.8 GRAMS
BH CV ECHO MEAS - LV MAX PG: 2.35 MMHG
BH CV ECHO MEAS - LV MEAN PG: 1.33 MMHG
BH CV ECHO MEAS - LV SYSTOLIC VOL/BSA (12-30): 22.3 CM2
BH CV ECHO MEAS - LV V1 MAX: 76.7 CM/SEC
BH CV ECHO MEAS - LV V1 VTI: 17.7 CM
BH CV ECHO MEAS - LVIDD: 4.7 CM
BH CV ECHO MEAS - LVIDS: 2.8 CM
BH CV ECHO MEAS - LVOT AREA: 2.5 CM2
BH CV ECHO MEAS - LVOT DIAM: 1.79 CM
BH CV ECHO MEAS - LVPWD: 0.6 CM
BH CV ECHO MEAS - MED PEAK E' VEL: 10.7 CM/SEC
BH CV ECHO MEAS - MV A MAX VEL: 37.7 CM/SEC
BH CV ECHO MEAS - MV DEC SLOPE: 488.3 CM/SEC2
BH CV ECHO MEAS - MV DEC TIME: 0.24 MSEC
BH CV ECHO MEAS - MV E MAX VEL: 92.5 CM/SEC
BH CV ECHO MEAS - MV E/A: 2.45
BH CV ECHO MEAS - MV MAX PG: 4.5 MMHG
BH CV ECHO MEAS - MV MEAN PG: 1.73 MMHG
BH CV ECHO MEAS - MV P1/2T: 61.4 MSEC
BH CV ECHO MEAS - MV V2 VTI: 33.5 CM
BH CV ECHO MEAS - MVA(P1/2T): 3.6 CM2
BH CV ECHO MEAS - MVA(VTI): 1.33 CM2
BH CV ECHO MEAS - PA ACC TIME: 0.17 SEC
BH CV ECHO MEAS - PA PR(ACCEL): 4.5 MMHG
BH CV ECHO MEAS - PA V2 MAX: 123.5 CM/SEC
BH CV ECHO MEAS - RAP SYSTOLE: 3 MMHG
BH CV ECHO MEAS - RVSP: 12 MMHG
BH CV ECHO MEAS - SI(MOD-SP2): 34.5 ML/M2
BH CV ECHO MEAS - SI(MOD-SP4): 35.8 ML/M2
BH CV ECHO MEAS - SV(LVOT): 44.7 ML
BH CV ECHO MEAS - SV(MOD-SP2): 57.7 ML
BH CV ECHO MEAS - SV(MOD-SP4): 59.8 ML
BH CV ECHO MEAS - TAPSE (>1.6): 2.03 CM
BH CV ECHO MEAS - TR MAX PG: 8.6 MMHG
BH CV ECHO MEAS - TR MAX VEL: 146.6 CM/SEC
BH CV ECHO MEASUREMENTS AVERAGE E/E' RATIO: 7.91
BH CV STRESS BP STAGE 1: NORMAL
BH CV STRESS BP STAGE 2: NORMAL
BH CV STRESS BP STAGE 3: NORMAL
BH CV STRESS DURATION MIN STAGE 1: 3
BH CV STRESS DURATION MIN STAGE 2: 3
BH CV STRESS DURATION MIN STAGE 3: 3
BH CV STRESS DURATION MIN STAGE 4: 0
BH CV STRESS DURATION SEC STAGE 1: 0
BH CV STRESS DURATION SEC STAGE 2: 0
BH CV STRESS DURATION SEC STAGE 3: 0
BH CV STRESS DURATION SEC STAGE 4: 58
BH CV STRESS GRADE STAGE 1: 10
BH CV STRESS GRADE STAGE 2: 12
BH CV STRESS GRADE STAGE 3: 14
BH CV STRESS GRADE STAGE 4: 16
BH CV STRESS HR STAGE 1: 107
BH CV STRESS HR STAGE 2: 123
BH CV STRESS HR STAGE 3: 139
BH CV STRESS HR STAGE 4: 151
BH CV STRESS METS STAGE 1: 5
BH CV STRESS METS STAGE 2: 7.5
BH CV STRESS METS STAGE 3: 10
BH CV STRESS METS STAGE 4: 13.5
BH CV STRESS O2 STAGE 1: 98
BH CV STRESS O2 STAGE 2: 99
BH CV STRESS O2 STAGE 3: 98
BH CV STRESS O2 STAGE 4: 98
BH CV STRESS PROTOCOL 1: NORMAL
BH CV STRESS RECOVERY BP: NORMAL MMHG
BH CV STRESS RECOVERY HR: 102 BPM
BH CV STRESS RECOVERY O2: 98 %
BH CV STRESS SPEED STAGE 1: 1.7
BH CV STRESS SPEED STAGE 2: 2.5
BH CV STRESS SPEED STAGE 3: 3.4
BH CV STRESS SPEED STAGE 4: 4.2
BH CV STRESS STAGE 1: 1
BH CV STRESS STAGE 2: 2
BH CV STRESS STAGE 3: 3
BH CV STRESS STAGE 4: 4
BH CV VAS BP LEFT ARM: NORMAL MMHG
BH CV XLRA - RV BASE: 3 CM
BH CV XLRA - RV LENGTH: 6.8 CM
BH CV XLRA - RV MID: 2.14 CM
BH CV XLRA - TDI S': 11.9 CM/SEC
LEFT ATRIUM VOLUME INDEX: 19.5 ML/M2
MAXIMAL PREDICTED HEART RATE: 178 BPM
MAXIMAL PREDICTED HEART RATE: 178 BPM
PERCENT MAX PREDICTED HR: 85.96 %
STRESS BASELINE BP: NORMAL MMHG
STRESS BASELINE HR: 64 BPM
STRESS O2 SAT REST: 100 %
STRESS PERCENT HR: 101 %
STRESS POST ESTIMATED WORKLOAD: 11.6 METS
STRESS POST EXERCISE DUR MIN: 9 MIN
STRESS POST EXERCISE DUR SEC: 58 SEC
STRESS POST O2 SAT PEAK: 98 %
STRESS POST PEAK BP: NORMAL MMHG
STRESS POST PEAK HR: 153 BPM
STRESS TARGET HR: 151 BPM
STRESS TARGET HR: 151 BPM

## 2022-10-25 PROCEDURE — 93018 CV STRESS TEST I&R ONLY: CPT | Performed by: INTERNAL MEDICINE

## 2022-10-25 PROCEDURE — 93017 CV STRESS TEST TRACING ONLY: CPT

## 2022-10-25 PROCEDURE — 93306 TTE W/DOPPLER COMPLETE: CPT

## 2022-10-25 PROCEDURE — 93306 TTE W/DOPPLER COMPLETE: CPT | Performed by: INTERNAL MEDICINE

## 2022-10-25 NOTE — TELEPHONE ENCOUNTER
----- Message from Niki Calvo CMA sent at 10/25/2022  3:13 PM EDT -----  Yes ma'am. I typed the wrong thing. I told him his echo. I'm sorry  ----- Message -----  From: Socorro Medeiros APRN  Sent: 10/25/2022   2:56 PM EDT  To: Niki Calvo CMA    You mean echo, correct?  ----- Message -----  From: Niki Calvo CMA  Sent: 10/25/2022   2:20 PM EDT  To: PAYTON Calzada    Patient notified of normal stress test results and verbalized understanding.    ----- Message -----  From: Socorro Medeiros APRN  Sent: 10/25/2022   2:15 PM EDT  To: Niki Calvo CMA    I spoke to this pt earlier today regarding his stress test. Would you mind calling him and letting him know that his heart echo was normal?    Thanks!  Socorro           Patient notified of normal results via patient portal.  April, CMA

## 2022-10-25 NOTE — TELEPHONE ENCOUNTER
Called patient with stress test results.  Patient had chest tightness on the treadmill.  There was a borderline 0.5 mm horizontal ST changes at peak stress that did not reach significance.  Due to exertional symptoms and borderline EKG findings would recommend coronary CTA, which I have ordered stat.  Patient reports he was just in the emergency room on 10/7.  He reports that he passed out and was told that he had a heart attack.  He was not admitted.  He is not sure which ED he went to.  He says it was in [place].  We will try and get results.  Overall at baseline EKG showed no ST segment deviations and it sounds reassuring that he was not admitted.  Advised to keep his upcoming follow-up with me.  He verbalized understanding with no further questions or concerns

## 2022-11-07 ENCOUNTER — TELEPHONE (OUTPATIENT)
Dept: CARDIOLOGY | Facility: HOSPITAL | Age: 42
End: 2022-11-07

## 2022-11-07 NOTE — TELEPHONE ENCOUNTER
----- Message from Bonnie Trevizo MA sent at 11/7/2022  8:51 AM EST -----  I sent a request out on Friday 11/4 and they sent back that they can not send anything out due to the system being down last month. I called today and they said the same thing. So I can not get records for his appointment at this time. Catrachita is who I spoke with said it could be later this week or next before they can get to the request that was sent..  ----- Message -----  From: Socorro Medeiros APRN  Sent: 11/4/2022  10:22 AM EST  To: Bonnie Trevizo MA    Hey, I still haven't received records on this gentleman. Can you see if you can get them?    Thanks!  ----- Message -----  From: Niki Calvo CMA  Sent: 10/25/2022  12:04 PM EDT  To: PAYTON Calzada    Records are able to be faxed for this date. I have requested.    ----- Message -----  From: Socorro Medeiros APRN  Sent: 10/25/2022  11:22 AM EDT  To: Niki Calvo CMA    Not sure if we can get [facility] records, but pt was in the ED 10/7. Can you try and get records?    Thanks!

## 2022-11-08 NOTE — PROGRESS NOTES
"[facility]  Heart and Valve Center    Chief Complaint  Follow-up and Dizziness    Subjective    History of Present Illness {CC  Problem List  Visit  Diagnosis   Encounters  Notes  Medications  Labs  Result Review Imaging  Media :23}     Meet Garner is a 42 y.o. male with hypertension, hyperlipidemia,anxiety, GERD who presents today to follow up on chest pain and syncope.    He reports around 2008 he started having episodes of near syncope/syncope. He says he would just pass out in class or just fall out of his bed. Symptoms also would happen after showers. He was told he had a heart attack. He was followed by Dr. Steinberg in [place], but after multiple attempts, we were unable to locate records.    He had a borderline GXT secondary to reproducible chest tightness that was exercised limiting.  There was also borderline 0.5 mm horizontal ST depression.  A coronary CTA was ordered but he did not show for this. Echo was normal. He was placed in an event monitor which was negative for arrhythmias.    He reports intermittent sharp pains at rest and with activity.He reports no further syncopal events. 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    No ETOH. 1 cup coffee     Cardiac risks: HTN, tobacco abuse, gender, family hx    Objective     Vital Signs:   Vitals:    11/09/22 0947   BP: 123/75   BP Location: Left arm   Patient Position: Sitting   Cuff Size: Adult   Pulse: 57   Resp: 16   Temp: 97.2 °F (36.2 °C)   TempSrc: Temporal   SpO2: 99%   Weight: 67.8 kg (149 lb 6 oz)   Height: 160 cm (62.99\")     Body mass index is 26.47 kg/m².  Physical Exam  Vitals reviewed.   Constitutional:       Appearance: Normal appearance.   HENT:      Head: Normocephalic.   Neck:      Vascular: No carotid bruit.   Cardiovascular:      Rate and Rhythm: Regular rhythm. Bradycardia present.      Pulses: Normal pulses.      Heart sounds: Normal heart sounds, S1 normal and S2 normal. No murmur " heard.  Pulmonary:      Effort: Pulmonary effort is normal. No respiratory distress.      Breath sounds: Normal breath sounds.   Chest:      Chest wall: No tenderness.   Abdominal:      General: Abdomen is flat.      Palpations: Abdomen is soft.   Musculoskeletal:      Cervical back: Neck supple.      Right lower leg: No edema.      Left lower leg: No edema.   Skin:     General: Skin is warm and dry.   Neurological:      General: No focal deficit present.      Mental Status: He is alert and oriented to person, place, and time. Mental status is at baseline.   Psychiatric:         Mood and Affect: Mood normal.         Behavior: Behavior normal.         Thought Content: Thought content normal.              Result Review  Data Reviewed:{ Labs  Result Review  Imaging  Med Tab  Media :23}   Treadmill Stress Test (10/25/2022 08:28)  Adult Transthoracic Echo Complete W/ Cont if Necessary Per Protocol (10/25/2022 10:34)  Event monitor x21 days negative for arrhythmias.  Triggered events were normal sinus rhythm and sinus tachycardia.  Average heart 79, minimum heart 43 and a maximal heart of 181 beats per min           Assessment and Plan {CC Problem List  Visit Diagnosis  ROS  Review (Popup)  Health Maintenance  Quality  BestPractice  Medications  SmartSets  SnapShot Encounters  Media :23}   1. Chest pain  Symptoms seem to be somewhat atypical, however due to exertional chest pain on the treadmill, recommend coronary CTA.  He missed his CT appointment.  Recommend that he reschedule this, notified scheduling    2. Near syncope  No arrhythmias on event monitor.  Symptoms likely from excessive heat and dehydration.  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 Family history of premature CAD  Patient reassured that he does not have heart failure based on recent testing.  Unable to obtain prior cardiac records while he was incarcerated.  We will do  coronary CTA for further cardiac restratification.  I have strongly encouraged him to quit smoking.  Discussed risks of ongoing tobacco abuse             Follow Up {Instructions Charge Capture  Follow-up Communications :23}   No follow-ups on file.    Patient was given instructions and counseling regarding his condition or for health maintenance advice. Please see specific information pulled into the AVS if appropriate.  Advised to call the Heart and Valve Center with any questions, concerns, or worsening symptoms.

## 2022-11-09 ENCOUNTER — OFFICE VISIT (OUTPATIENT)
Dept: CARDIOLOGY | Facility: HOSPITAL | Age: 42
End: 2022-11-09

## 2022-11-09 VITALS
TEMPERATURE: 97.2 F | BODY MASS INDEX: 26.47 KG/M2 | SYSTOLIC BLOOD PRESSURE: 123 MMHG | RESPIRATION RATE: 16 BRPM | HEART RATE: 57 BPM | DIASTOLIC BLOOD PRESSURE: 75 MMHG | HEIGHT: 63 IN | OXYGEN SATURATION: 99 % | WEIGHT: 149.38 LBS

## 2022-11-09 PROCEDURE — 99213 OFFICE O/P EST LOW 20 MIN: CPT | Performed by: NURSE PRACTITIONER

## 2022-11-09 RX ORDER — CHOLECALCIFEROL (VITAMIN D3) 125 MCG
5 CAPSULE ORAL
COMMUNITY
Start: 2022-08-22

## 2022-11-18 ENCOUNTER — TELEPHONE (OUTPATIENT)
Dept: FAMILY MEDICINE CLINIC | Facility: CLINIC | Age: 42
End: 2022-11-18

## 2022-11-18 ENCOUNTER — OFFICE VISIT (OUTPATIENT)
Dept: FAMILY MEDICINE CLINIC | Facility: CLINIC | Age: 42
End: 2022-11-18

## 2022-11-18 VITALS
HEIGHT: 63 IN | OXYGEN SATURATION: 99 % | HEART RATE: 55 BPM | SYSTOLIC BLOOD PRESSURE: 122 MMHG | WEIGHT: 151.4 LBS | DIASTOLIC BLOOD PRESSURE: 70 MMHG | BODY MASS INDEX: 26.82 KG/M2

## 2022-11-18 DIAGNOSIS — L24.89 IRRITANT CONTACT DERMATITIS DUE TO OTHER AGENTS: ICD-10-CM

## 2022-11-18 DIAGNOSIS — G47.9 DIFFICULTY SLEEPING: ICD-10-CM

## 2022-11-18 DIAGNOSIS — F41.9 ANXIETY: ICD-10-CM

## 2022-11-18 DIAGNOSIS — R35.0 URINARY FREQUENCY: ICD-10-CM

## 2022-11-18 DIAGNOSIS — R45.89 DEPRESSED MOOD: ICD-10-CM

## 2022-11-18 DIAGNOSIS — I10 PRIMARY HYPERTENSION: Primary | ICD-10-CM

## 2022-11-18 DIAGNOSIS — Z82.49 FAMILY HISTORY OF PREMATURE CAD: ICD-10-CM

## 2022-11-18 DIAGNOSIS — R00.2 PALPITATIONS: ICD-10-CM

## 2022-11-18 PROCEDURE — 99214 OFFICE O/P EST MOD 30 MIN: CPT | Performed by: STUDENT IN AN ORGANIZED HEALTH CARE EDUCATION/TRAINING PROGRAM

## 2022-11-18 RX ORDER — LISINOPRIL 20 MG/1
20 TABLET ORAL DAILY
Qty: 90 TABLET | Refills: 1 | Status: SHIPPED | OUTPATIENT
Start: 2022-11-18

## 2022-11-18 RX ORDER — PANTOPRAZOLE SODIUM 40 MG/1
40 TABLET, DELAYED RELEASE ORAL DAILY
Qty: 90 TABLET | Refills: 1 | Status: SHIPPED | OUTPATIENT
Start: 2022-11-18

## 2022-11-18 RX ORDER — AMLODIPINE BESYLATE 5 MG/1
5 TABLET ORAL DAILY
Qty: 90 TABLET | Refills: 1 | Status: SHIPPED | OUTPATIENT
Start: 2022-11-18

## 2022-11-18 RX ORDER — TRAZODONE HYDROCHLORIDE 50 MG/1
TABLET ORAL
Qty: 30 TABLET | Refills: 2 | Status: SHIPPED | OUTPATIENT
Start: 2022-11-18 | End: 2023-01-09

## 2022-11-18 RX ORDER — TAMSULOSIN HYDROCHLORIDE 0.4 MG/1
1 CAPSULE ORAL DAILY
Qty: 90 CAPSULE | Refills: 1 | Status: SHIPPED | OUTPATIENT
Start: 2022-11-18

## 2022-11-18 RX ORDER — ATORVASTATIN CALCIUM 40 MG/1
40 TABLET, FILM COATED ORAL DAILY
Qty: 90 TABLET | Refills: 1 | Status: SHIPPED | OUTPATIENT
Start: 2022-11-18

## 2022-11-18 NOTE — TELEPHONE ENCOUNTER
Called patient  Relayed message. He understood.    Per Dr. uriostegui   would you mind calling this patient to let him know I reviewed his vitals from today and his heart rate was a little low. I think it would be best to cut his metoprolol in half and have sent a new prescription to his pharmacy indicating to take one half tablet (12.5mg twice daily) instead of a full

## 2022-11-18 NOTE — PROGRESS NOTES
Established Office Visit      Patient Name: Meet Garner  : 1980   MRN: [id number]   Care Team: Patient Care Team:  Catrachita Miller DO as PCP - General (Internal Medicine)    Chief Complaint:    Chief Complaint   Patient presents with   • Hypertension     recheck   • Anxiety     recheck   • Depression     recheck       History of Present Illness: Meet Garner is a 42 y.o. male with HTN, anxiety, depression, GERD, tobacco use who is here today to follow up with HTN, anxiety, depression.    Lexapro was increase from 10mg to 20mg last visit. Reports anxiety has significantly improved. Depression has improved as well but still room for improvement. Reports feeling irritable, difficulty sleeping due to overthinking.     He is working 3p-11p and planning to start 3p-3a soon. Worried about his sleep schedule and difficulty sleeping. Feels anxious about this.     HTN - started amlodipine last visit and stopped HCTZ. BP remains very well controlled with amlodipine. He has noticed significant improvement in urinary frequency since discontinuing HCTZ.    He has spot on his left wrist which has been bothering him. Ongoing for 2 weeks. Believes it is due to wearing a new watch. Reports irritated, red, dry patch. Has been putting lotion on it without relief.       Subjective      Review of Systems:   Review of Systems - See HPI    I have reviewed and the following portions of the patient's history were updated as appropriate: past family history, past medical history, past social history, past surgical history and problem list.    Medications:     Current Outpatient Medications:   •  amLODIPine (NORVASC) 5 MG tablet, Take 1 tablet by mouth Daily., Disp: 90 tablet, Rfl: 1  •  atorvastatin (LIPITOR) 40 MG tablet, Take 1 tablet by mouth Daily., Disp: 90 tablet, Rfl: 1  •  escitalopram (Lexapro) 20 MG tablet, Take 1 tablet by mouth Daily., Disp: 30 tablet, Rfl: 5  •  lisinopril (PRINIVIL,ZESTRIL) 20 MG  "tablet, Take 1 tablet by mouth Daily., Disp: 90 tablet, Rfl: 1  •  melatonin 5 MG tablet tablet, Take 1 tablet by mouth every night at bedtime., Disp: , Rfl:   •  metoprolol tartrate (LOPRESSOR) 25 MG tablet, Take 0.5 tablets by mouth 2 (Two) Times a Day., Disp: 90 tablet, Rfl: 1  •  nitroglycerin (NITROSTAT) 0.3 MG SL tablet, Place 0.3 mg under the tongue., Disp: , Rfl:   •  pantoprazole (PROTONIX) 40 MG EC tablet, Take 1 tablet by mouth Daily., Disp: 90 tablet, Rfl: 1  •  tamsulosin (FLOMAX) 0.4 MG capsule 24 hr capsule, Take 1 capsule by mouth Daily., Disp: 90 capsule, Rfl: 1  •  traZODone (DESYREL) 50 MG tablet, Take half tablet at bedtime for three nights. Then increase to one tablet at bedtime if tolerating well, Disp: 30 tablet, Rfl: 2  •  triamcinolone (KENALOG) 0.1 % ointment, Apply 1 application topically to the appropriate area as directed 2 (Two) Times a Day., Disp: 30 g, Rfl: 0    Allergies:   Allergies   Allergen Reactions   • Codeine Swelling   • Penicillins Unknown - High Severity       Objective     Physical Exam:  Vital Signs:   Vitals:    11/18/22 0831   BP: 122/70   Pulse: 55   SpO2: 99%   Weight: 68.7 kg (151 lb 6.4 oz)   Height: 160 cm (62.99\")     Body mass index is 26.83 kg/m².     Physical Exam  Vitals reviewed.   Constitutional:       Appearance: Normal appearance.   Cardiovascular:      Rate and Rhythm: Bradycardia present.      Pulses: Normal pulses.   Pulmonary:      Effort: Pulmonary effort is normal. No respiratory distress.   Skin:     General: Skin is warm and dry.      Comments: Left wrist with 1x2in  erythematous, dry patch along watch line   Neurological:      Mental Status: He is alert.   Psychiatric:         Mood and Affect: Mood normal.         Behavior: Behavior normal.         Judgment: Judgment normal.         Assessment / Plan      Assessment/Plan:   Problems Addressed This Visit  Diagnoses and all orders for this visit:    1. Primary hypertension (Primary)  2. Family " history of premature CAD  3. Palpitations  -     Discontinue: metoprolol tartrate (LOPRESSOR) 25 MG tablet; Take 1 tablet by mouth 2 (Two) Times a Day.  Dispense: 180 tablet; Refill: 1  -     atorvastatin (LIPITOR) 40 MG tablet; Take 1 tablet by mouth Daily.  Dispense: 90 tablet; Refill: 1  -     metoprolol tartrate (LOPRESSOR) 25 MG tablet; Take 0.5 tablets by mouth 2 (Two) Times a Day.  Dispense: 90 tablet; Refill: 1    BP well controlled with current metoprolol, lisinopril and amlodipine.   Bradycardic today - HR 55. Likely due to metoprolol, will reduce dose from 25mg BID to 12.5mg BID.     4. Irritant contact dermatitis due to other agents  -     triamcinolone (KENALOG) 0.1 % ointment; Apply 1 application topically to the appropriate area as directed 2 (Two) Times a Day.  Dispense: 30 g; Refill: 0  Advised patient to avoid known irritant. Triamcinolone cream BID x 1 week    5. Depressed mood  6. Anxiety  7. Difficulty sleeping  -     traZODone (DESYREL) 50 MG tablet; Take half tablet at bedtime for three nights. Then increase to one tablet at bedtime if tolerating well  Dispense: 30 tablet; Refill: 2    Significant improvement in overall anxiety and partial improvement in depression with lexapro 20mg daily. Will continue this without changes. Add trazodone 25mg qhs to help with depressed mood and sleep. Ok to increase to 50mg qhs after first 3 nights if tolerating well    8. Urinary frequency  -     tamsulosin (FLOMAX) 0.4 MG capsule 24 hr capsule; Take 1 capsule by mouth Daily.  Dispense: 90 capsule; Refill: 1    Significant improvement after discontinuing HCTZ. Continue flomax without changes.     Other orders  -     pantoprazole (PROTONIX) 40 MG EC tablet; Take 1 tablet by mouth Daily.  Dispense: 90 tablet; Refill: 1          Plan of care reviewed with patient at the conclusion of today's visit. Education was provided regarding diagnosis and management.  Patient verbalizes understanding of and agreement with  management plan.    Follow Up:   Return in about 6 months (around 5/18/2023) for Annual.        DO DYLAN Willis RD  [facility]  [address]  [facility] [phone]  Fax [phone]  Phone [phone]

## 2022-12-02 ENCOUNTER — TELEPHONE (OUTPATIENT)
Dept: FAMILY MEDICINE CLINIC | Facility: CLINIC | Age: 42
End: 2022-12-02

## 2022-12-02 NOTE — TELEPHONE ENCOUNTER
Caller: Meet Garner    Relationship: Self    Best call back number: [phone]    What was the call regarding: PATIENT CALLED IN WITH A SORE THROAT, RUNNY NOSE, CHILLS, COUGH. REQUESTING ANY MEDICATION TO HELP WITH THE SYMPTOMS. PATIENT ALSO IS REQUESTING A DOCTORS NOTE FROM WORK FOR 11/30, 12/1, 12/2, AND 12/3.    Do you require a callback: YES

## 2022-12-02 NOTE — TELEPHONE ENCOUNTER
Patient states that he was evaluated at an ER recently and was dx with pneumonia. He is unable to recall the dates or which facility he was seen    No availabilities for primary care appointment, referred patient to [facility]

## 2022-12-19 DIAGNOSIS — Z12.11 SCREENING FOR COLON CANCER: Primary | ICD-10-CM

## 2023-01-06 ENCOUNTER — OUTSIDE FACILITY SERVICE (OUTPATIENT)
Dept: GASTROENTEROLOGY | Facility: CLINIC | Age: 43
End: 2023-01-06
Payer: MEDICAID

## 2023-01-06 DIAGNOSIS — R45.89 DEPRESSED MOOD: ICD-10-CM

## 2023-01-06 DIAGNOSIS — G47.9 DIFFICULTY SLEEPING: ICD-10-CM

## 2023-01-06 PROCEDURE — 45378 DIAGNOSTIC COLONOSCOPY: CPT | Performed by: INTERNAL MEDICINE

## 2023-01-09 RX ORDER — TRAZODONE HYDROCHLORIDE 50 MG/1
TABLET ORAL
Qty: 90 TABLET | Refills: 0 | Status: SHIPPED | OUTPATIENT
Start: 2023-01-09

## 2023-01-09 NOTE — TELEPHONE ENCOUNTER
Rx Refill Note  Requested Prescriptions     Pending Prescriptions Disp Refills   • traZODone (DESYREL) 50 MG tablet [Pharmacy Med Name: traZODone HCl 50 MG Oral Tablet] 90 tablet 0     Sig: TAKE 1/2 (ONE-HALF) TABLET BY MOUTH AT BEDTIME FOR  3  NIGHTS  ,  THEN  INCREASE  TO  ONE  TABLET  AT  BEDTIME  IF  TOLERATING  WELL      Last office visit with prescribing clinician: 11/18/2022   Last telemedicine visit with prescribing clinician: 5/19/2023   Next office visit with prescribing clinician: 5/19/2023                         Would you like a call back once the refill request has been completed: [] Yes [] No    If the office needs to give you a call back, can they leave a voicemail: [] Yes [] No    Destinee Shannon MA  01/09/23, 07:37 EST

## 2023-04-10 DIAGNOSIS — R45.89 DEPRESSED MOOD: ICD-10-CM

## 2023-04-10 DIAGNOSIS — G47.9 DIFFICULTY SLEEPING: ICD-10-CM

## 2023-04-10 RX ORDER — TRAZODONE HYDROCHLORIDE 50 MG/1
TABLET ORAL
Qty: 90 TABLET | Refills: 0 | Status: SHIPPED | OUTPATIENT
Start: 2023-04-10

## 2023-04-10 NOTE — TELEPHONE ENCOUNTER
Rx Refill Note  Requested Prescriptions     Pending Prescriptions Disp Refills   • traZODone (DESYREL) 50 MG tablet [Pharmacy Med Name: traZODone HCl 50 MG Oral Tablet] 90 tablet 0     Sig: TAKE 1/2 (ONE-HALF) TABLET BY MOUTH AT BEDTIME FOR  3  NIGHT  THEN  INCREASE  TO  ONE  TABLET  AT  BEDTIME  IF  TOLERATING  WELL      Last office visit with prescribing clinician: 11/18/2022   Last telemedicine visit with prescribing clinician: 5/19/2023   Next office visit with prescribing clinician: 5/19/2023                         Would you like a call back once the refill request has been completed: [] Yes [] No    If the office needs to give you a call back, can they leave a voicemail: [] Yes [] No    Destinee Shannon MA  04/10/23, 12:28 EDT